# Patient Record
Sex: FEMALE | Race: WHITE | Employment: UNEMPLOYED | URBAN - METROPOLITAN AREA
[De-identification: names, ages, dates, MRNs, and addresses within clinical notes are randomized per-mention and may not be internally consistent; named-entity substitution may affect disease eponyms.]

---

## 2021-11-30 ENCOUNTER — TELEPHONE (OUTPATIENT)
Dept: BEHAVIORAL HEALTH | Facility: CLINIC | Age: 21
End: 2021-11-30

## 2021-11-30 ENCOUNTER — HOSPITAL ENCOUNTER (EMERGENCY)
Facility: CLINIC | Age: 21
Discharge: HOME | End: 2021-11-30
Attending: EMERGENCY MEDICINE | Admitting: EMERGENCY MEDICINE
Payer: COMMERCIAL

## 2021-11-30 VITALS
SYSTOLIC BLOOD PRESSURE: 135 MMHG | DIASTOLIC BLOOD PRESSURE: 87 MMHG | OXYGEN SATURATION: 100 % | RESPIRATION RATE: 16 BRPM | TEMPERATURE: 97.8 F | HEART RATE: 91 BPM

## 2021-11-30 DIAGNOSIS — R45.851 SUICIDAL THOUGHTS: ICD-10-CM

## 2021-11-30 DIAGNOSIS — F32.A DEPRESSION, UNSPECIFIED DEPRESSION TYPE: ICD-10-CM

## 2021-11-30 PROCEDURE — 99285 EMERGENCY DEPT VISIT HI MDM: CPT | Mod: 25

## 2021-11-30 PROCEDURE — 90791 PSYCH DIAGNOSTIC EVALUATION: CPT

## 2021-11-30 ASSESSMENT — ENCOUNTER SYMPTOMS
COUGH: 0
NERVOUS/ANXIOUS: 1
FEVER: 0
ABDOMINAL PAIN: 1
DYSPHORIC MOOD: 1

## 2021-11-30 NOTE — TELEPHONE ENCOUNTER
First attempt at reaching patient. Left message for the patient with information on the TC and asked for a return call to schedule    ----- Message from TERESA Burks sent at 11/30/2021  5:48 AM CST -----  Regarding: Transition Appointment  Transition Clinic Referral     Type of Referral:      _____Therapy    _____Therapy & Medication (Therapy will be scheduled first)  _____Medication Only    Referring Provider Name: TERESA Burks    Clinician completing the assessment. Beckie Waters    Referring Provider Contact Phone Number: [phone]    Reason for Transition Clinic Referral: Patient's appointment to begin services is not until 12/8/2021. Patient has never been to therapy. Has difficulty with follow through due to increased anxiety.     Next Level of Care Patient Will Be Transitioned To: Psychiatry and Therapy    Start Date for Next Level of Care (Required): 11/30 for psychiatry and 12/8 for therapy     Type of Clinical Assessment Completed (Crisis, DA, BOUCHRA, etc.): Crisis    Date Clinical Assessment was Completed: 11/30/2021    Diagnosis: F43.9 Unspecified Trauma   Sounds like a historical criteria for reactive attachment disorder    What Would Be Helpful from the Transition Clinic: To follow up with patient and assist with making appointments even when fearful or nervous. Work through those barriers.     Needs: NO    Does Patient Have Access to Technology: Yes    Patient E-mail Address: [email or web address]    Current Patient Phone Number: [phone]; [phone](cell)    Clinician Gender Preference (if applicable): NO    TERESA Burks

## 2021-11-30 NOTE — ED PROVIDER NOTES
"  History   Chief Complaint:  Suicidal Ideation     HPI   Ольга Whitaker is a 21 year old female with history of anxiety who presents accompanied by her significant other for evaluation of suicidal ideation. The patient reports that over the last year she has been struggling with feelings of depression and anxiety with intermittent thoughts of suicide. She presents to the ED tonight with increased thoughts of suicide stating that she is \"emotionally out of control\" and \"manically angry.\" She denies any history of self-harm or suicide attempts, states that she has not done anything to harm herself tonight, and has no specific plan for how she would commit suicide. She does not see a therapist or psychiatrist and has never been hospitalized for her mental health. She notes that she has been very stressed due to financial issues recently. She also reports that she smokes marijuana frequently but denies other drug use or significant alcohol use.     She also reports that she has a several year history of intermittent upper abdominal pain that she attributes to reflux. She has taken TUMS in the past with some improvement of her symptoms. This pain is worse after eating. She denies any recent fever or cough. She has no known COVID-19 exposures or sick contacts, and she has received one dose of the COVID-19 vaccine. She does not believe that she is pregnant.     Review of Systems   Constitutional: Negative for fever.   Respiratory: Negative for cough.    Gastrointestinal: Positive for abdominal pain.   Psychiatric/Behavioral: Positive for dysphoric mood and suicidal ideas. The patient is nervous/anxious.    All other systems reviewed and are negative.      Allergies:  Amoxicillin   Penicillins     Medications:  Duloxetine     Past Medical History:     Anxiety      Social History:  The patient presents to the ED accompanied by her significant other.   The patient lives with her significant other.   Drug use: Marijuana "   Alcohol use: Negative     Physical Exam     Patient Vitals for the past 24 hrs:   BP Temp Temp src Pulse Resp SpO2   11/30/21 0030 (!) 141/84 97.8  F (36.6  C) Oral 90 16 99 %       Physical Exam  General: Teary adult female sitting upright  Eyes: PERRL, Conjunctive within normal limits  ENT: Moist mucous membranes, oropharynx clear.   CV: Normal S1S2, no murmur, rub or gallop. Regular rate and rhythm  Resp: Clear to auscultation bilaterally, no wheezes, rales or rhonchi. Normal respiratory effort.  GI: Abdomen is soft, nontender and nondistended. No palpable masses. No rebound or guarding.  MSK: No edema. Nontender. Normal active range of motion.  Skin: Warm and dry. No rashes or lesions or ecchymoses on visible skin.  Neuro: Alert and oriented. Responds appropriately to all questions and commands. No focal findings appreciated. Normal muscle tone.  Psych: Depressed mood. Good eye contact. Teary at times.    Emergency Department Course     Emergency Department Course:  Reviewed:  I reviewed nursing notes, vitals and past medical history    Assessments:  0106: I obtained history and examined the patient as noted above.   Patient reassessed. Aware of plan, agreeable. No new concerns.     Consults:  0520: I spoke to DEC regarding the patient.     Disposition:  The patient was discharged to home.     Impression & Plan       Medical Decision Making:  Ольга Whitaker is a 21-year-old female with history anxiety who presents emergency department with concerns for suicidal thoughts in the setting of increasing anxiety and depression.  There is no reported self-harm.  She does not have a plan to harm herself.  She has not sought out treatment or therapy with a therapist or psychiatrist for this long-term issue with anxiety and depression.  She has multiple situational stressors she described.  She is medically cleared for a DEC assessment.  Ultimately DEC was able to schedule a psychiatry appointment for later today which  is fortunate.  The patient is not actively suicidal and seems low risk for discharge home.  She feels comfortable with the plan.  Is recommended further outpatient assessment with a medical provider for the long-term issues she has had with upper abdominal pain, unchanged today, with no indication for emergent therapy or assessment.  All questions were answered prior to discharge    Diagnosis:    ICD-10-CM    1. Suicidal thoughts  R45.851    2. Depression, unspecified depression type  F32.A         Scribe Disclosure:  I, Cricket Harrington, am serving as a scribe at 12:46 AM on 11/30/2021 to document services personally performed by Asha Elliott MD based on my observations and the provider's statements to me.         Asha Elliott MD  12/01/21 2705
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
## 2021-11-30 NOTE — DISCHARGE INSTRUCTIONS
Aftercare Plan  If I am feeling unsafe or I am in a crisis, I will:   Contact my established care providers   Call the National Suicide Prevention Lifeline: 431.897.3521   Go to the nearest emergency room   Call [phone]     Warning signs that I or other people might notice when a crisis is developing for me: Thoughts I would be better off dead, loss of time or recollection of time, increasing anxiety.     Things I am able to do on my own to cope or help me feel better: Deep breathing, Grounding techniques, schedule a therapy appointment.     Things that I am able to do with others to cope or help me better: Share with someone I trust, Go for a walk, Participate in a fun activity.     Things I can use or do for distraction: Bake a cake, swim, spend time with snakes     Changes I can make to support my mental health and wellness: Make all scheduled appointments. Follow therapeutic recommendations.     People in my life that I can ask for help: My fiance, friend, therapists     Your [facility] has a mental health crisis team you can call 24/7: [facility] Mobile Crisis  [phone]    Other things that are important when I m in crisis: My snakes, my future, my contribution to society, my artwork.     Additional resources and information: Grounding is a very helpful technique if you are experiencing flashbacks and   you find yourself sometimes losing touch with the present moment. Having this symptom   of PTSD is not only terrifying for you, but it can also be scary for people around you,   such as friends and family.   Grounding teaches you to stop losing touch with the present moment by concentrating   and focusing on the present or by directing your attention to something else.   Some Examples of Grounding   Touch objects around you, and describe them (texture, colour). For example,  I m   sitting on a red chair, and the fabric is really soft; it s velvet. The carpet is beige,   and there is a red couch in the  corner.   Run water over your hands, and describe aloud how it feels.   Name all the different types of animals you can think of (e.g., zebra, cat, dog,   cow, etc ), or types of flowers, cities in [place] etc   Say the alphabet backwards    TIPS FOR GROUNDIN. Eyes open. When doing grounding techniques, make sure to keep  your eyes open, so that you can see and focus on what is around you  right now. It is also a good idea to speak out loud, describing what you  are seeing and doing.  2. Practise: Like any other skill, it is important to practise grounding  techniques. It will be most useful if you have tried using this skill when  you were calm, and you practised it often. That way, when you find  yourself needing to use it, you already know how.  3. Enlist help: Teach a friend or family member about grounding and  why you need to use it. If someone you trust understands when  grounding is useful, they can remind you to use it (and do it with you) if  you are starting to lose touch with the present. For example, they might  say,  I think you might want to do some grounding now  can you  describe what you are wearing? What am I wearing? Where are we  right now?    What is  calm breathing ?   Calm breathing (sometimes called  diaphragmatic breathing ) is a technique that helps   you slow down your breathing when feeling stressed or anxious. [place] babies   naturally breathe this way, and singers, wind instrument players, and yoga practitioners   use this type of breathing.   Why is calm breathing important?   ? Our breathing changes when we are feeling anxious. We tend to take short,  quick, shallow breaths, or even hyperventilate; this is called  overbreathing .  ? It is a good idea to learn techniques for managing  overbreathing , because this  type of breathing can actually make you feel even more anxious (e.g., due to a  racing heart, dizziness, or headaches)!  ? Calm breathing is a great portable tool that  you can use whenever you are feeling  anxious. However, it does require some practice.  Key point: Like other anxiety-management skills, the purpose of calm   breathing is not to avoid anxiety at all costs, but just to take the edge off or   help you  ride out  the feelings.   How to Do It   Calm breathing involves taking smooth, slow, and regular breaths. Sitting upright is   usually better than lying down or slouching, because it can increase the capacity of your   lungs to fill with air. It is best to 'take the weight' off your shoulders by supporting your   arms on the side-arms of a chair, or on your lap.   1. Take a slow breath in through the nose, breathing into your lower  belly (for about 4 seconds)  2. Hold your breath for 1 or 2 seconds  3. Exhale slowly through the mouth (for about 4 seconds)  4. Wait a few seconds before taking another breath    Anxiety Edie 2  About 6-8 breathing cycles per minute is often helpful to decrease anxiety, but find your   own comfortable breathing rhythm. These cycles regulate the amount of oxygen you   take in so that you do not experience the fainting, tingling, and giddy sensations that are   sometimes associated with overbreathing.   Helpful Hints:    Make sure that you aren t hyperventitating; it is important to pause for a few   seconds after each breath.    Try to breathe from your diaphragm or abdomen. Your shoulders and chest area   should be fairly relaxed and still. If this is challenging at first, it can be helpful to   first try this exercise by lying down on the floor with one hand on your heart, the   other hand on your abdomen. Watch the hand on your abdomen rise as you fill   your lungs with air, expanding your chest. (The hand over your heart should   barely move, if at all.)   Rules of practice:   Try calm breathing for at least five minutes twice a day.    You do not need to be feeling anxious to practice - in fact, at first you   should practice while  "feeling relatively calm. You need to be comfortable   breathing this way when feeling calm, before you can feel comfortable doing it   when anxious. You ll gradually master this skill and feel the benefits!   Once you are comfortable with this technique, you can start using it in situations   that cause anxiety.    Crisis Lines  Crisis Text Line  Text 568555  You will be connected with a trained live crisis counselor to provide support.    National Hope Line  1.800.SUICIDE [4572859]      Community Resources  Fast Tracker  Linking people to mental health and substance use disorder resources  [employer].PingMe     [place] Mental Health Warm Line  Peer to peer support  Monday thru Saturday, 12 pm to 10 pm  532.918.6577 or 9.755.041.0739  Text \"Support\" to 07431    National [place] on Mental Illness (JESSICA)  901.091.4307 or 1888.JESSICA.HELPS        Mental Health Apps  My3  https://[employer].PingMe/    VirtualHopeBox  [email or web address]/apps/virtual-hope-box/          Date: Tuesday, 11/30/2021  Time: 3:00 pm - 4:00 pm  Provider: Augie Turpin MD, MD  Location: [place] [address]  Phone: [phone]  Type: Telepsychiatry    Scheduling Instructions  Please provide the patients' first and last name, email, phone number, and all their health insurance information. The patient must be a [place] resident and can access a computer for their virtual visit. We are only able to accept English speaking patients at this time. Please schedule the appointments at least 48 hours in advanced of the visit.  Patient Instructions  [facility] is a [place] virtual psychiatric clinic. All appointments are through our HIPPA compliant platform. You will receive an on-boarding email from [facility] to discuss the policies of the clinic and assist you with this process and set up the secure email link for your virtual visit. Once we receive your insurance information the " appointment time will be secured. If you have any questions, please call our direct number and we will assist you. Our phone number is: [phone]  Other Information  [phone] email: [email or web address] ([place])  Scheduled By: Beckie Mccormick  Scheduled On: 11/30/2021 5:14 am        Scheduled Appointment  Date: Wednesday, 12/8/2021  Time: 2:00 pm - 3:00 pm  Provider: Sergei Martel  Location: [place] [address]  Phone: [phone]  Type: Day Treatment    Scheduling Instructions  Adults 18+ Longer focused treatment component. APPOINTMENTS ARE VIA TELEHEALTH AT THIS TIME  Patient Instructions  APPOINTMENTS ARE VIA TELEHEALTH AT THIS TIME  Other Information  [phone] email: [email or web address] ([place])  Scheduled By: Beckie Mccormick  Scheduled On: 11/30/2021 5:13 am

## 2021-11-30 NOTE — ED TRIAGE NOTES
"Pt arrives with friend after onset of suicidal ideation and \"not remembering things\". Pt states she smokes \"a lot of weed\", last use four days ago. Pt reports abd pain that \"feels like acid reflux\", pt states she has had this pain \"her whole life\". She went to see an MD, but did not get her abd pain addressed.   "

## 2021-12-01 ENCOUNTER — TELEPHONE (OUTPATIENT)
Dept: BEHAVIORAL HEALTH | Facility: CLINIC | Age: 21
End: 2021-12-01
Payer: COMMERCIAL

## 2021-12-01 NOTE — TELEPHONE ENCOUNTER
This coordinator received call from pt and scheduled pt with TC for 12/2 at 3pm. Coordinator will serena referral as complete and will inform referral source of scheduled appointment.     ----- Message from TERESA Burks sent at 11/30/2021  5:48 AM CST -----  Regarding: Transition Appointment  Transition Clinic Referral     Type of Referral:      _____Therapy    _____Therapy & Medication (Therapy will be scheduled first)  _____Medication Only    Referring Provider Name: TERESA Burks    Clinician completing the assessment. Beckie Waters    Referring Provider Contact Phone Number: [phone]    Reason for Transition Clinic Referral: Patient's appointment to begin services is not until 12/8/2021. Patient has never been to therapy. Has difficulty with follow through due to increased anxiety.     Next Level of Care Patient Will Be Transitioned To: Psychiatry and Therapy    Start Date for Next Level of Care (Required): 11/30 for psychiatry and 12/8 for therapy     Type of Clinical Assessment Completed (Crisis, DA, BOUCHRA, etc.): Crisis    Date Clinical Assessment was Completed: 11/30/2021    Diagnosis: F43.9 Unspecified Trauma   Sounds like a historical criteria for reactive attachment disorder    What Would Be Helpful from the Transition Clinic: To follow up with patient and assist with making appointments even when fearful or nervous. Work through those barriers.     Needs: NO    Does Patient Have Access to Technology: Yes    Patient E-mail Address: [email or web address]    Current Patient Phone Number: [phone]; [phone](cell)    Clinician Gender Preference (if applicable): NO    TERESA Burks

## 2021-12-02 ENCOUNTER — VIRTUAL VISIT (OUTPATIENT)
Dept: BEHAVIORAL HEALTH | Facility: CLINIC | Age: 21
End: 2021-12-02
Payer: COMMERCIAL

## 2021-12-02 DIAGNOSIS — F43.9 TRAUMA AND STRESSOR-RELATED DISORDER: Primary | ICD-10-CM
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
## 2021-12-07 ENCOUNTER — VIRTUAL VISIT (OUTPATIENT)
Dept: BEHAVIORAL HEALTH | Facility: CLINIC | Age: 21
End: 2021-12-07
Payer: COMMERCIAL

## 2021-12-07 DIAGNOSIS — F43.9 TRAUMA AND STRESSOR-RELATED DISORDER: Primary | ICD-10-CM
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
## 2021-12-09 ENCOUNTER — VIRTUAL VISIT (OUTPATIENT)
Dept: BEHAVIORAL HEALTH | Facility: CLINIC | Age: 21
End: 2021-12-09
Payer: COMMERCIAL

## 2021-12-09 DIAGNOSIS — F43.9 TRAUMA AND STRESSOR-RELATED DISORDER: Primary | ICD-10-CM
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
## 2021-12-14 ENCOUNTER — VIRTUAL VISIT (OUTPATIENT)
Dept: BEHAVIORAL HEALTH | Facility: CLINIC | Age: 21
End: 2021-12-14
Payer: COMMERCIAL

## 2021-12-14 DIAGNOSIS — F41.9 ANXIETY DISORDER, UNSPECIFIED TYPE: ICD-10-CM

## 2021-12-14 DIAGNOSIS — F43.10 POSTTRAUMATIC STRESS DISORDER: Primary | ICD-10-CM

## 2021-12-14 DIAGNOSIS — F33.1 MAJOR DEPRESSIVE DISORDER, RECURRENT EPISODE, MODERATE (H): ICD-10-CM
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
## 2021-12-17 ASSESSMENT — ANXIETY QUESTIONNAIRES
6. BECOMING EASILY ANNOYED OR IRRITABLE: SEVERAL DAYS
3. WORRYING TOO MUCH ABOUT DIFFERENT THINGS: SEVERAL DAYS
GAD7 TOTAL SCORE: 5
2. NOT BEING ABLE TO STOP OR CONTROL WORRYING: SEVERAL DAYS
5. BEING SO RESTLESS THAT IT IS HARD TO SIT STILL: NOT AT ALL
1. FEELING NERVOUS, ANXIOUS, OR ON EDGE: SEVERAL DAYS
IF YOU CHECKED OFF ANY PROBLEMS ON THIS QUESTIONNAIRE, HOW DIFFICULT HAVE THESE PROBLEMS MADE IT FOR YOU TO DO YOUR WORK, TAKE CARE OF THINGS AT HOME, OR GET ALONG WITH OTHER PEOPLE: SOMEWHAT DIFFICULT
7. FEELING AFRAID AS IF SOMETHING AWFUL MIGHT HAPPEN: NOT AT ALL

## 2021-12-17 ASSESSMENT — PATIENT HEALTH QUESTIONNAIRE - PHQ9
SUM OF ALL RESPONSES TO PHQ QUESTIONS 1-9: 9
5. POOR APPETITE OR OVEREATING: SEVERAL DAYS

## 2021-12-18 ASSESSMENT — ANXIETY QUESTIONNAIRES: GAD7 TOTAL SCORE: 5

## 2021-12-26 ENCOUNTER — TELEPHONE (OUTPATIENT)
Dept: BEHAVIORAL HEALTH | Facility: CLINIC | Age: 21
End: 2021-12-26
Payer: COMMERCIAL

## 2021-12-26 NOTE — TELEPHONE ENCOUNTER
Per clinician's request, this coordinator made first attempt to reschedule appointment with TC. No answer, left VM.

## 2021-12-27 ENCOUNTER — TELEPHONE (OUTPATIENT)
Dept: BEHAVIORAL HEALTH | Facility: CLINIC | Age: 21
End: 2021-12-27
Payer: COMMERCIAL

## 2021-12-27 NOTE — TELEPHONE ENCOUNTER
Per clinician's request, this coordinator made VM attempt to reschedule appointment with TC. No answer, left VM.

## 2022-05-11 ENCOUNTER — HOSPITAL ENCOUNTER (EMERGENCY)
Facility: CLINIC | Age: 22
Discharge: HOME | End: 2022-05-11
Attending: EMERGENCY MEDICINE | Admitting: EMERGENCY MEDICINE
Payer: COMMERCIAL

## 2022-05-11 VITALS
DIASTOLIC BLOOD PRESSURE: 84 MMHG | OXYGEN SATURATION: 97 % | HEART RATE: 78 BPM | TEMPERATURE: 99.4 F | RESPIRATION RATE: 18 BRPM | SYSTOLIC BLOOD PRESSURE: 121 MMHG

## 2022-05-11 DIAGNOSIS — K08.89 PAIN, DENTAL: ICD-10-CM

## 2022-05-11 PROCEDURE — 99282 EMERGENCY DEPT VISIT SF MDM: CPT

## 2022-05-11 RX ORDER — CLINDAMYCIN HCL 150 MG
450 CAPSULE ORAL 3 TIMES DAILY
Qty: 63 CAPSULE | Refills: 0 | Status: SHIPPED | OUTPATIENT
Start: 2022-05-11 | End: 2022-05-18

## 2022-05-11 RX ORDER — CHLORHEXIDINE GLUCONATE ORAL RINSE 1.2 MG/ML
15 SOLUTION DENTAL 2 TIMES DAILY
Qty: 473 ML | Refills: 0 | Status: SHIPPED | OUTPATIENT
Start: 2022-05-11

## 2022-05-11 ASSESSMENT — ENCOUNTER SYMPTOMS: FEVER: 0

## 2022-05-11 NOTE — ED TRIAGE NOTES
"  Pt arrives to ED with pain to L top canine after eating today. Pt states her tooth started to bleed and then there is \"a hole\" causing her pain. No bleeding in triage. Pt still has all four wisdom teeth and needs to be seen by a dentist. No meds PTA.      Triage Assessment     Row Name 05/11/22 0114       Triage Assessment (Adult)    Airway WDL WDL       Respiratory WDL    Respiratory WDL WDL              "

## 2022-05-11 NOTE — ED PROVIDER NOTES
"  History   Chief Complaint:  Dental Pain       The history is provided by the patient.      Ольга Whitaker is an otherwise healthy 21 year old female who presents with left sided upper dental pain. Earlier tonight, she noticed swelling and bleeding to her left upper canine. She also feels a \"hole\" causing her pain. She mentions having a accident a month ago where she bite down hard on her jaw. Additionally, all 4 of her wisdom teeth are coming and it is causing her jaws to swell. She has not seen a dentist for this as she has not had an appointment in 15 years. She denies fevers.     Review of Systems   Constitutional: Negative for fever.   HENT: Positive for dental problem.    All other systems reviewed and are negative.    Allergies:  Amoxicillin    Medications:  Cleocin   Zithromax     Past Medical History:     Patient denies pertinent past medical history.    Past Surgical History:    Patient denies pertinent past surgical history.     Family History:    Patient denies pertinent family history.      Social History:  Patient presents to the ED with significant other via private vehicle     Physical Exam     Patient Vitals for the past 24 hrs:   BP Temp Temp src Pulse Resp SpO2   05/11/22 0113 121/84 99.4  F (37.4  C) Temporal 78 18 97 %       Physical Exam  General:  Sitting on bed, comfortable appearing.   Head:  No obvious trauma to head  Ears, Nose:  External ears normal.  Nose normal.  mild left upper canine with tenderness to percussion.  Mild gum irritation.  No bleeding.  No abscess on exam.  No trismus.  Soft floor of the mouth.     Eyes:  Conjunctivae clear.  Pupils round and symmetry.    Neck:  Normal range of motion. Neck supple and symmetric.    Pulm/Chest:  No respiratory distress.  Breathing comfortably.    CV: Regular rate and rhythm.    MSK:  Normal range of motion.   Neuro:  Alert. Moving all extremities.    Skin:  Skin is warm and dry.      Emergency Department Course   Emergency Department " Course:    Reviewed:  I reviewed nursing notes, vitals and past medical history    Assessments:  0143 I obtained history and examined the patient as noted above. I explained findings and discussed plan for discharge home.    Disposition:  The patient was discharged to home.     Impression & Plan   Medical Decision Makin-year-old female presents with tooth pain.  Vital signs are reassuring.  Broad differential was pursued including not limited to periapical abscess, gingivitis, fracture, laceration, cracked tooth, etc.  Teeth appear to be intact.  There is some mild gingival irritation near the canine.  She reports some discomfort with left upper canine palpation.  Concern for possible periapical abscess.  No abscess amenable to drainage on examination.  There is no signs of Speedy's angina, deep space neck infection, etc.  No indication for imaging.  Soft floor of mouth.  No trismus.  Patient is otherwise well-appearing nontoxic.  At this point patient will be treated with mild gingivitis with Peridex mouthwash and antibiotics for possible infection.  Advise close follow-up with dentist.  Recommend Tylenol ibuprofen for pain.  Patient was discharged home.    Diagnosis:    ICD-10-CM    1. Pain, dental  K08.89        Discharge Medications:  Discharge Medication List as of 2022  2:02 AM      START taking these medications    Details   chlorhexidine (PERIDEX) 0.12 % solution Swish and spit 15 mLs in mouth 2 times daily, Disp-473 mL, R-0, E-Prescribe      clindamycin (CLEOCIN) 150 MG capsule Take 3 capsules (450 mg) by mouth 3 times daily for 7 days, Disp-63 capsule, R-0, E-Prescribe             Scribe Disclosure:  I, Simón Tavarez, am serving as a scribe at 2:03 AM on 2022 to document services personally performed by Klarissa Cervantes MD based on my observations and the provider's statements to me.        Klarissa Cervantes MD  [phone]

## 2022-05-11 NOTE — DISCHARGE INSTRUCTIONS
Please follow up with a dentist as soon as possible.  Return to the ED if you notice fevers, increasing swelling or pain, difficulty swallowing or drooling, voice changes or other acute concerns.  May use tylenol and ibuprofen for pain control.  Please complete course of antibiotics for dental infection.      Discharge Instructions  Dental Pain    You have been seen today for a toothache. Your pain may be caused by an exposed nerve, an infection (pulpitis), a root abscess (pocket of pus), or other problems. You will need to see a dentist for a solution to your tooth problem. Emergency Department care is only to help control your problem until you can see a dentist; we cannot provide complete dental care.  Today, we did not find any sign that your toothache was caused by any dangerous or life-threatening condition, but sometimes symptoms develop over time and cannot be found during an emergency visit, so it is very important that you follow up with your dentist.      Generally, every Emergency Department visit should have a follow-up clinic visit with either a primary or a specialty clinic/provider. Please follow-up as instructed by your emergency provider today.    Return to the Emergency Department if:  You develop a new fever over 100.4 F.  You cannot open your mouth normally, cannot move your tongue well, or cannot swallow.  You have new or increased swelling of your face or neck.  You develop drainage of pus or foul smelling material from around your tooth.  What can I do to help myself?  Take any antibiotic the provider may have prescribed for you today.  Avoid very hot or very cold foods as both can cause pain.  Make an appointment to see a dentist as soon as possible. Dentists are generally not  on-staff  at hospitals so we cannot  refer  to you to dentist but we may be able to provide a list of dental clinics to help you.  If you were given a prescription for medicine here today, be sure to read all of the  information (including the package insert) that comes with your prescription.  This will include important information about the medicine, its side effects, and any warnings that you need to know about.  The pharmacist who fills the prescription can provide more information and answer questions you may have about the medicine.  If you have questions or concerns that the pharmacist cannot address, please call or return to the Emergency Department.   Remember that you can always come back to the Emergency Department if you are not able to see your regular provider in the amount of time listed above, if you get any new symptoms, or if there is anything that worries you.    Dental Providers    EMERGENCY DENTAL CARE    [facility]       [phone]  [facility] (Hours 9a-9p)  [phone]  [facility]    [phone]  [facility]   [phone]        REFERRALS    [facility] [facility]    [phone]  [facility]    [phone]  [facility]    [phone]    DENTAL CLINICS    Many of these clinics have reduced cost or payment plans, some take walk-ins. Call the clinic to get this information.      [facility]     [phone]  [facility]      [phone]  [facility]   [phone]   [facility]      [phone]  [facility]   [phone]  [facility]     [phone]  [facility] (appointment only)   [phone]  [facility]      [phone]  [facility]      [phone]  [facility]     [phone]  [facility]    [phone]  [facility]     [phone]  [facility]     [phone]  [facility] (free tooth pulling Monday & Wednesday) [phone]  [facility]:   Adults       [phone]   Children      [phone]   Emergency      [phone]  [facility]     [phone]  [facility]       [phone]  [facility]     [phone]